# Patient Record
Sex: FEMALE | Race: BLACK OR AFRICAN AMERICAN | NOT HISPANIC OR LATINO | Employment: FULL TIME | URBAN - METROPOLITAN AREA
[De-identification: names, ages, dates, MRNs, and addresses within clinical notes are randomized per-mention and may not be internally consistent; named-entity substitution may affect disease eponyms.]

---

## 2021-04-21 LAB
BILIRUB SERPL-MCNC: NEGATIVE MG/DL
BLOOD URINE, POC: NORMAL
CLARITY, POC UA: NORMAL
COLOR, POC UA: YELLOW
GLUCOSE UR QL STRIP: NEGATIVE
KETONES UR QL STRIP: NEGATIVE
LEUKOCYTE EST, POC UA: NORMAL
NITRITE, POC UA: NEGATIVE
PH, POC UA: 6.5
PROTEIN, POC: NORMAL
SPECIFIC GRAVITY, POC UA: 1.01
UROBILINOGEN, POC UA: NORMAL

## 2021-05-12 ENCOUNTER — HISTORICAL (OUTPATIENT)
Dept: ADMINISTRATIVE | Facility: HOSPITAL | Age: 41
End: 2021-05-12

## 2021-05-12 ENCOUNTER — HISTORICAL (OUTPATIENT)
Dept: URGENT CARE | Facility: CLINIC | Age: 41
End: 2021-05-12

## 2021-05-12 LAB
BILIRUB SERPL-MCNC: NEGATIVE MG/DL
BLOOD URINE, POC: NEGATIVE
CLARITY, POC UA: CLEAR
COLOR, POC UA: YELLOW
GLUCOSE UR QL STRIP: NEGATIVE
KETONES UR QL STRIP: NEGATIVE
LEUKOCYTE EST, POC UA: NEGATIVE
NITRITE, POC UA: NEGATIVE
PH, POC UA: 7
PROTEIN, POC: NEGATIVE
SPECIFIC GRAVITY, POC UA: 1.01
UROBILINOGEN, POC UA: NORMAL

## 2021-05-15 LAB — FINAL CULTURE: NO GROWTH

## 2021-12-30 LAB
INFLUENZA A ANTIGEN, POC: NEGATIVE
INFLUENZA B ANTIGEN, POC: NEGATIVE
RAPID GROUP A STREP (OHS): NEGATIVE
SARS-COV-2 RNA RESP QL NAA+PROBE: NEGATIVE

## 2022-01-10 ENCOUNTER — HISTORICAL (OUTPATIENT)
Dept: ADMINISTRATIVE | Facility: HOSPITAL | Age: 42
End: 2022-01-10

## 2022-01-10 LAB — SARS-COV-2 RNA RESP QL NAA+PROBE: NEGATIVE

## 2022-04-11 ENCOUNTER — HISTORICAL (OUTPATIENT)
Dept: ADMINISTRATIVE | Facility: HOSPITAL | Age: 42
End: 2022-04-11

## 2022-04-29 VITALS
BODY MASS INDEX: 24.1 KG/M2 | HEIGHT: 64 IN | WEIGHT: 141.13 LBS | DIASTOLIC BLOOD PRESSURE: 91 MMHG | WEIGHT: 137.38 LBS | SYSTOLIC BLOOD PRESSURE: 132 MMHG | BODY MASS INDEX: 23.45 KG/M2 | SYSTOLIC BLOOD PRESSURE: 118 MMHG | OXYGEN SATURATION: 100 % | HEIGHT: 64 IN | OXYGEN SATURATION: 100 % | DIASTOLIC BLOOD PRESSURE: 84 MMHG

## 2022-05-05 NOTE — HISTORICAL [facility]ER
This is a historical note converted from Migue. Formatting and pictures may have been removed.  Please reference Migue for original formatting and attached multimedia. Chief Complaint  discharge, ordor, urgency and burning while urinating x 3-4 days  History of Present Illness  40-year-old female presents to clinic complaining of a malodorous vaginal discharge with urinary urgency and burning for the past 3 to 4 days. ?Patient did have a UTI April 21. ?Was treated with Macrobid. ?States the symptoms did resolve. ?There was no culture done.? Patient is monogamous in a relationship.? Denies any abdominal pain?back pain or fever. ?Denies any blood in the urine.? States that?the discharge is a thin white color.? Does not believe that it is yeast.  Review of Systems  Constitutional: negative except as stated in HPI  ?Eye: negative except as stated in HPI  ?ENT: negative except as stated in HPI  ?Respiratory: negative except as stated in HPI  ?Cardiovascular: negative except as stated in HPI  ?Gastrointestinal: negative except as stated in HPI  ?Genitourinary: negative except as stated in HPI  ?Hema/Lymph: negative except as stated in HPI  ?Endocrine: negative except as stated in HPI  ?Immunologic: negative except as stated in HPI  ?Musculoskeletal: negative except as stated in HPI  ?Integumentary: negative except as stated in HPI  ?Neurologic: negative except as stated in HPI  ?All Other ROS_ negative except as stated in HPI  Physical Exam  Vitals & Measurements  T:?36.8? ?C (Oral)? HR:?97(Peripheral)? RR:?18? BP:?132/91? SpO2:?100%?  HT:?163.00?cm? WT:?62.300?kg? BMI:?23.45?   exam: Chaperone Yazmin present.? Speculum exam performed.? Very scant amount of a thin white?discharge in the vaginal vault.? Swabs were obtained.  Assessment/Plan  1.?Vaginitis?N76.0  ?Topical metronidazole has been sent to the pharmacy.? We will call you with?your test results as they become available.? If your symptoms worsen return to  clinic or seek medical attention immediately  Ordered:  SHELLEY Hollisdelmis, Trich vag [id number], Now collect, Urine, Order for future visit, 05/12/21 18:19:00 CDT, Stop date 05/12/21 18:19:00 CDT, Nurse collect, Vaginitis  Dysuria, 05/12/21 18:19:00 CDT  Chlam trachom & N gonorrhoeae by CRISTIN-[facility] [id number], Routine collect, Urine, 05/12/21 18:12:00 CDT, Stop date 05/12/21 18:12:00 CDT, Nurse collect, Vaginitis  Dysuria, 05/12/21 18:12:00 CDT  Miscellaneous Lab Test, Routine collect, Other, 05/12/21 18:12:00 CDT, Order for future visit, [id number] BV, Trich, Vag Yeast Aptima swab, Lab Collect, Stop date 05/12/21 18:12:00 CDT, Vaginitis  Dysuria  Urine Culture [id number], Routine collect, 05/12/21 18:07:00 CDT, Order for future visit, Urine, Nurse collect, Stop date 05/12/21 18:07:00 CDT, Vaginitis  Dysuria  ?  2.?Dysuria?R30.0  Ordered:  SHELLEY Hollisdelmis, Trich vag [id number], Now collect, Urine, Order for future visit, 05/12/21 18:19:00 CDT, Stop date 05/12/21 18:19:00 CDT, Nurse collect, Vaginitis  Dysuria, 05/12/21 18:19:00 CDT  Chlam agataom & N gonorrhoeae by CRISTIN-[facility] [id number], Routine collect, Urine, 05/12/21 18:12:00 CDT, Stop date 05/12/21 18:12:00 CDT, Nurse collect, Vaginitis  Dysuria, 05/12/21 18:12:00 CDT  Miscellaneous Lab Test, Routine collect, Other, 05/12/21 18:12:00 CDT, Order for future visit, [id number] BV, Trich, Vag Yeast Aptima swab, Lab Collect, Stop date 05/12/21 18:12:00 CDT, Vaginitis  Dysuria  Urine Culture [id number], Routine collect, 05/12/21 18:07:00 CDT, Order for future visit, Urine, Nurse collect, Stop date 05/12/21 18:07:00 CDT, Vaginitis  Dysuria  ?  Orders:  metroNIDAZOLE topical, 1 kamila, VAG, Once a day (at bedtime), X 5 day(s), # 70 gm, 0 Refill(s), Pharmacy: [facility] [id number], 163, cm, Height/Length Dosing, 05/12/21 17:33:00 CDT, 62.3, kg, Weight Dosing, 05/12/21 17:33:00 CDT   Problem List/Past Medical History  Ongoing  Depression  Historical  No qualifying  data  Procedure/Surgical History  Breast lift (2001)     Medications  alPRAzolam 1 mg oral tablet, 1 mg= 1 tab(s), Oral, Daily  buPROPion 150 mg/24 hours (XL) oral tablet, extended release, 150 mg= 1 tab(s), Oral, Daily  metroNIDAZOLE 0.75% vaginal gel with applicator, 1 kamila, VAG, Once a day (at bedtime)  Vyvanse 40 mg oral capsule, 40 mg= 1 cap(s), Oral, qAM  Allergies  HYDROcodone?(Vomiting)  Social History  Abuse/Neglect  No, 2021  Alcohol  Current, 1-2 times per week, 2021  Substance Use  Never, 2021  Tobacco  4 or less cigarettes(less than 1/4 pack)/day in last 30 days, No, 2021  Family History  Family history is negative  Health Maintenance  Health Maintenance  ???Pending?(in the next year)  ??? ??OverDue  ??? ? ? ?Influenza Vaccine due??10/01/20??and every 1??day(s)  ??? ? ? ?Alcohol Misuse Screening due??21??and every 1??year(s)  ??? ??Due?  ??? ? ? ?ADL Screening due??21??and every 1??year(s)  ??? ? ? ?Cervical Cancer Screening due??21??Unknown Frequency  ??? ? ? ?Depression Screening due??21??Unknown Frequency  ??? ? ? ?Lipid Screening due??21??Unknown Frequency  ??? ? ? ?Tetanus Vaccine due??21??and every 10??year(s)  ??? ??Refused?  ??? ? ? ?Smoking Cessation due??21??Variable frequency  ??? ??Due In Future?  ??? ? ? ?Obesity Screening not due until??22??and every 1??year(s)  ???Satisfied?(in the past 1 year)  ??? ??Satisfied?  ??? ? ? ?Blood Pressure Screening on??21.??Satisfied by Rosa Mobley  ??? ? ? ?Body Mass Index Check on??21.??Satisfied by Erick Hager  ??? ? ? ?Obesity Screening on??21.??Satisfied by Erick Hager  ??? ??Refused?  ??? ? ? ?Smoking Cessation on??21.??Recorded by Flaquito Jaquez  ?

## 2022-09-22 ENCOUNTER — HISTORICAL (OUTPATIENT)
Dept: ADMINISTRATIVE | Facility: HOSPITAL | Age: 42
End: 2022-09-22
Payer: COMMERCIAL

## 2022-11-12 ENCOUNTER — OFFICE VISIT (OUTPATIENT)
Dept: URGENT CARE | Facility: CLINIC | Age: 42
End: 2022-11-12
Payer: COMMERCIAL

## 2022-11-12 VITALS
OXYGEN SATURATION: 99 % | TEMPERATURE: 98 F | RESPIRATION RATE: 18 BRPM | HEART RATE: 100 BPM | HEIGHT: 64 IN | DIASTOLIC BLOOD PRESSURE: 73 MMHG | SYSTOLIC BLOOD PRESSURE: 104 MMHG | BODY MASS INDEX: 23.05 KG/M2 | WEIGHT: 135 LBS

## 2022-11-12 DIAGNOSIS — R19.7 DIARRHEA, UNSPECIFIED TYPE: ICD-10-CM

## 2022-11-12 DIAGNOSIS — R11.10 VOMITING, UNSPECIFIED VOMITING TYPE, UNSPECIFIED WHETHER NAUSEA PRESENT: Primary | ICD-10-CM

## 2022-11-12 LAB
CTP QC/QA: YES
CTP QC/QA: YES
FLUAV AG NPH QL: NEGATIVE
FLUBV AG NPH QL: NEGATIVE
SARS-COV-2 RDRP RESP QL NAA+PROBE: NEGATIVE

## 2022-11-12 PROCEDURE — 87804 INFLUENZA ASSAY W/OPTIC: CPT | Mod: QW,,, | Performed by: FAMILY MEDICINE

## 2022-11-12 PROCEDURE — 3078F DIAST BP <80 MM HG: CPT | Mod: CPTII,,, | Performed by: FAMILY MEDICINE

## 2022-11-12 PROCEDURE — 3074F PR MOST RECENT SYSTOLIC BLOOD PRESSURE < 130 MM HG: ICD-10-PCS | Mod: CPTII,,, | Performed by: FAMILY MEDICINE

## 2022-11-12 PROCEDURE — 3008F PR BODY MASS INDEX (BMI) DOCUMENTED: ICD-10-PCS | Mod: CPTII,,, | Performed by: FAMILY MEDICINE

## 2022-11-12 PROCEDURE — 3078F PR MOST RECENT DIASTOLIC BLOOD PRESSURE < 80 MM HG: ICD-10-PCS | Mod: CPTII,,, | Performed by: FAMILY MEDICINE

## 2022-11-12 PROCEDURE — 99213 OFFICE O/P EST LOW 20 MIN: CPT | Mod: 25,,, | Performed by: FAMILY MEDICINE

## 2022-11-12 PROCEDURE — 87635: ICD-10-PCS | Mod: QW,,, | Performed by: FAMILY MEDICINE

## 2022-11-12 PROCEDURE — 87804 POCT INFLUENZA A/B: ICD-10-PCS | Mod: 59,QW,, | Performed by: FAMILY MEDICINE

## 2022-11-12 PROCEDURE — 1159F MED LIST DOCD IN RCRD: CPT | Mod: CPTII,,, | Performed by: FAMILY MEDICINE

## 2022-11-12 PROCEDURE — 87635 SARS-COV-2 COVID-19 AMP PRB: CPT | Mod: QW,,, | Performed by: FAMILY MEDICINE

## 2022-11-12 PROCEDURE — 3008F BODY MASS INDEX DOCD: CPT | Mod: CPTII,,, | Performed by: FAMILY MEDICINE

## 2022-11-12 PROCEDURE — 1159F PR MEDICATION LIST DOCUMENTED IN MEDICAL RECORD: ICD-10-PCS | Mod: CPTII,,, | Performed by: FAMILY MEDICINE

## 2022-11-12 PROCEDURE — 99213 PR OFFICE/OUTPT VISIT, EST, LEVL III, 20-29 MIN: ICD-10-PCS | Mod: 25,,, | Performed by: FAMILY MEDICINE

## 2022-11-12 PROCEDURE — 3074F SYST BP LT 130 MM HG: CPT | Mod: CPTII,,, | Performed by: FAMILY MEDICINE

## 2022-11-12 RX ORDER — LISDEXAMFETAMINE DIMESYLATE 50 MG/1
50 CAPSULE ORAL EVERY MORNING
COMMUNITY
Start: 2022-10-17

## 2022-11-12 RX ORDER — ALPRAZOLAM 1 MG/1
1 TABLET ORAL DAILY PRN
COMMUNITY
Start: 2022-08-02

## 2022-11-12 RX ORDER — ONDANSETRON 8 MG/1
8 TABLET, ORALLY DISINTEGRATING ORAL EVERY 8 HOURS PRN
Qty: 20 TABLET | Refills: 0 | Status: SHIPPED | OUTPATIENT
Start: 2022-11-12

## 2022-11-12 NOTE — PROGRESS NOTES
Patient ID: [id number]     Chief Complaint: upper respiratory tract infection symptoms    History of Present Illness:     Ainsley Agudelo is a 42 y.o. female  who presents today for symptoms of Nausea  Vomiting fever and diarrhea starting 2 days ago.    Reports she ate chicken of questionable freshness and she believes she has food poisoning.  Feeling much better today than yesterday.    Pt denies experiencing any, difficulty breathing, dysphagia, or neck stiffness.  No blood in the stool or vomitus.    Past Medical History:     ----------------------------  ADHD (attention deficit hyperactivity disorder)  Anxiety     Past Surgical History:   Procedure Laterality Date     SECTION      MASTOPEXY         Review of patient's allergies indicates:   Allergen Reactions    Hydrocodone      Other reaction(s): Vomiting       Outpatient Medications Marked as Taking for the 22 encounter (Office Visit) with Antoine Solomon MD   Medication Sig Dispense Refill    ALPRAZolam (XANAX) 1 MG tablet Take 1 mg by mouth daily as needed.      VYVANSE 50 mg capsule Take 50 mg by mouth every morning.         Social History     Socioeconomic History    Marital status:    Tobacco Use    Smoking status: Every Day     Types: Vaping with nicotine    Smokeless tobacco: Never   Substance and Sexual Activity    Alcohol use: Yes        Family History   Problem Relation Age of Onset    Mitral valve prolapse Mother     Liver disease Father     Heart disease Father         Subjective:     Review of Systems   Constitutional:  Positive for fever. Negative for chills, malaise/fatigue and weight loss.   HENT:  Negative for congestion and sore throat.    Respiratory:  Negative for cough, sputum production, shortness of breath, wheezing and stridor.    Gastrointestinal:  Positive for diarrhea, nausea and vomiting. Negative for abdominal pain.   Musculoskeletal:  Negative for myalgias and neck pain.     Objective:     /73    "Pulse 100   Temp 98 °F (36.7 °C)   Resp 18   Ht 5' 4" (1.626 m)   Wt 61.2 kg (135 lb)   LMP  (LMP Unknown)   SpO2 99%   BMI 23.17 kg/m²     Physical Exam  Vitals and nursing note reviewed.   Constitutional:       General: She is not in acute distress.     Appearance: Normal appearance. She is normal weight. She is not ill-appearing or toxic-appearing.   HENT:      Head: Normocephalic and atraumatic.      Right Ear: Tympanic membrane and ear canal normal.      Left Ear: Tympanic membrane and ear canal normal.      Nose: Nose normal. No congestion or rhinorrhea.      Mouth/Throat:      Pharynx: No oropharyngeal exudate or posterior oropharyngeal erythema.   Eyes:      General: No scleral icterus.        Right eye: No discharge.         Left eye: No discharge.      Extraocular Movements: Extraocular movements intact.      Conjunctiva/sclera: Conjunctivae normal.   Cardiovascular:      Rate and Rhythm: Normal rate and regular rhythm.      Heart sounds: Normal heart sounds. No murmur heard.    No friction rub. No gallop.   Pulmonary:      Effort: Pulmonary effort is normal. No respiratory distress.      Breath sounds: No stridor. No wheezing, rhonchi or rales.   Musculoskeletal:      Cervical back: Normal range of motion. No rigidity or tenderness.   Lymphadenopathy:      Cervical: No cervical adenopathy.   Neurological:      Mental Status: She is alert.   Psychiatric:         Mood and Affect: Mood normal.         Behavior: Behavior normal.       Assessment & Plan:       ICD-10-CM ICD-9-CM   1. Vomiting, unspecified vomiting type, unspecified whether nausea present  R11.10 787.03   2. Diarrhea, unspecified type  R19.7 787.91        1. Vomiting, unspecified vomiting type, unspecified whether nausea present  -     POCT COVID-19 Rapid Screening  -     POCT Influenza A/B  -     ondansetron (ZOFRAN-ODT) 8 MG TbDL; Take 1 tablet (8 mg total) by mouth every 8 (eight) hours as needed (for nausea and vomitting).  Dispense: " 20 tablet; Refill: 0    2. Diarrhea, unspecified type  -     POCT COVID-19 Rapid Screening  -     POCT Influenza A/B       Influenza negative, and Covid negative.  She was okay with getting Zofran and will report back fail to improve.

## 2022-11-12 NOTE — PROGRESS NOTES
"Subjective:       Patient ID: Ainsley Agudelo is a 42 y.o. female.    Vitals:  height is 5' 4" (1.626 m) and weight is 61.2 kg (135 lb). Her temperature is 98 °F (36.7 °C). Her blood pressure is 104/73 and her pulse is 100. Her respiration is 18 and oxygen saturation is 99%.     Chief Complaint: Nausea    Nausea/vomiting, fever, diarrhea starting Thursday night.       ROS    Objective:      Physical Exam      Assessment:       1. Vomiting, unspecified vomiting type, unspecified whether nausea present    2. Diarrhea, unspecified type          Plan:         Vomiting, unspecified vomiting type, unspecified whether nausea present  -     POCT COVID-19 Rapid Screening  -     POCT Influenza A/B    Diarrhea, unspecified type  -     POCT COVID-19 Rapid Screening  -     POCT Influenza A/B                   "